# Patient Record
(demographics unavailable — no encounter records)

---

## 2024-10-14 NOTE — HISTORY OF PRESENT ILLNESS
[FreeTextEntry1] : Location: knees, right worse medially Severity: moderate.  It was better after PT.  Duration: few months Context: plays basketball and baseball Aggravating Factors: walking Alleviating Factors: PT, rest Associated Symptoms: denies weight loss, fever, chills, change in bowel/bladder habits, weakness, numbness/tingling,  Prior Studies:  Back and hips hurting still.  Pain with sitting.  He was in car for 4 hr to [facility]. No pain down legs.  Hx of walking pigeon toed.

## 2024-10-14 NOTE — ASSESSMENT
[FreeTextEntry1] : Discussed diagnosis and treatment plan including PT. no running or sports for now.  When can walk and do stairs without pain, can jog slowly.  Then can go faster in a few days if no pain.  Slowly progress.  ice area often. Try voltaren gel Reminded he needs to stretch daily multiple times.   take ibuprofen prn but never before exercise Limit sitting. Improve posture corrected hip abd learn side planks, side shuffle, running form work on walking normally with heel to toe  basketball starts in Nov see Rheumatologist given laxity and stretchy skin  f/u 3 wk mom and dad present during visit  I spent 30 min  obtaining appropriate history and listening to the patient performing a medically necessary, pertinent and appropriate examination discussing diagnosis, treatment options, and plan documenting visit

## 2024-10-14 NOTE — DATA REVIEWED
[FreeTextEntry1] : xrays of lumbar ap and lateral show loss of lordosis. NO other gross abnormalities.

## 2024-10-14 NOTE — PHYSICAL EXAM
[FreeTextEntry1] : 17 year old male in NAD and normal body habitus   Gait - right foot pronates a bit   KNEE  no swelling, erythema, warmth genu recurvatum flexion to 120 deg no TTP in patellar tendon, TTP in right medial knee 5/5 knee ext pop angle 70 deg quads a bit tight  LUMBAR ROM: flexion to 30 deg, ext to 5 deg  Gait: normal  Inspection: no erythema, warmth; poor sittling posture Spine: no TTP in spinous process Bony palpation: no TTP in GT   Soft tissue palpation of spine: no TTP in lumbar paraspinals  5/5 bilateral KE, DF, PF, 4 left hip abd, 5- right hip abd sensation intact in bilat LE  Special tests: neg seated SLR can bring thumb close to forearm

## 2024-11-08 NOTE — CONSULT LETTER
[Dear  ___] : Dear  [unfilled], [Courtesy Letter:] : I had the pleasure of seeing your patient, [unfilled], in my office today. [Please see my note below.] : Please see my note below. [Consult Closing:] : Thank you very much for allowing me to participate in the care of this patient.  If you have any questions, please do not hesitate to contact me. [Sincerely,] : Sincerely, [FreeTextEntry2] : Ashanti Aviles MD [address] [place], [place] [id number] [FreeTextEntry3] : Denise Cerrato MD Attending Physician, Pediatric Rheumatology [facility] | [facility]

## 2024-11-08 NOTE — PHYSICAL EXAM
[PERRLA] : LUCILA [S1, S2 Present] : S1, S2 present [Clear to auscultation] : clear to auscultation [Soft] : soft [NonTender] : non tender [Non Distended] : non distended [Normal Bowel Sounds] : normal bowel sounds [No Abnormal Lymph Nodes Palpated] : no abnormal lymph nodes palpated [No Hepatosplenomegaly] : no hepatosplenomegaly [Range Of Motion] : full range of motion [Intact Judgement] : intact judgement  [Insight Insight] : intact insight [Cranial nerves grossly intact] : cranial nerves grossly intact [Not Examined] : not examined [_______] : HIP: [unfilled] [Acute distress] : no acute distress [Erythematous Conjunctiva] : nonerythematous conjunctiva [Erythematous Oropharynx] : nonerythematous oropharynx [Lesions] : no lesions [Murmurs] : no murmurs [Joint effusions] : no joint effusions [FreeTextEntry1] : well-appearing [de-identified] : no signs of arthritis, +patellar grind bilaterally, tenderness along lateral aspect of left knee  [de-identified] : FROM, no tenderness [de-identified] : no tenderness [de-identified] : no tenderness [de-identified] : no tenderness [de-identified] : full forward flexion [de-identified] : no gross discrepancy [de-identified] : none noted

## 2024-11-08 NOTE — CONSULT LETTER
[Dear  ___] : Dear  [unfilled], [Courtesy Letter:] : I had the pleasure of seeing your patient, [unfilled], in my office today. [Please see my note below.] : Please see my note below. [Consult Closing:] : Thank you very much for allowing me to participate in the care of this patient.  If you have any questions, please do not hesitate to contact me. [Sincerely,] : Sincerely, [FreeTextEntry2] : Ashanti Aviles MD [address] [place], [place] [id number] [FreeTextEntry3] : Denise Cerrato MD Attending Physician, Pediatric Rheumatology [facility] | [facility]

## 2024-11-08 NOTE — PHYSICAL EXAM
[PERRLA] : LUCILA [S1, S2 Present] : S1, S2 present [Clear to auscultation] : clear to auscultation [Soft] : soft [NonTender] : non tender [Non Distended] : non distended [Normal Bowel Sounds] : normal bowel sounds [No Hepatosplenomegaly] : no hepatosplenomegaly [No Abnormal Lymph Nodes Palpated] : no abnormal lymph nodes palpated [Range Of Motion] : full range of motion [Intact Judgement] : intact judgement  [Insight Insight] : intact insight [Cranial nerves grossly intact] : cranial nerves grossly intact [Not Examined] : not examined [_______] : HIP: [unfilled] [Acute distress] : no acute distress [Erythematous Conjunctiva] : nonerythematous conjunctiva [Erythematous Oropharynx] : nonerythematous oropharynx [Lesions] : no lesions [Murmurs] : no murmurs [Joint effusions] : no joint effusions [FreeTextEntry1] : well-appearing [de-identified] : no signs of arthritis, +patellar grind bilaterally, tenderness along lateral aspect of left knee  [de-identified] : FROM, no tenderness [de-identified] : no tenderness [de-identified] : no tenderness [de-identified] : no tenderness [de-identified] : full forward flexion [de-identified] : no gross discrepancy [de-identified] : none noted

## 2024-11-08 NOTE — END OF VISIT
Continue Regimen: Triamcinolone from home for flares.
Detail Level: Zone
[Time Spent: ___ minutes] : I have spent [unfilled] minutes of time on the encounter which excludes teaching and separately reported services.

## 2024-11-08 NOTE — IMMUNIZATIONS
[Immunizations are up to date] : Immunizations are up to date [Records maintained by [facility]] : Records maintained by [facility]

## 2024-11-08 NOTE — HISTORY OF PRESENT ILLNESS
[Noncontributory] : The patient's family history was noncontributory [Unlimited ADLs] : able to do activities of daily living without limitations [Unlimited Sports] : able to participate in sports without limitations [FreeTextEntry1] : Jose is a 17-year-old male who presents for evaluation of joint pain.   Father reports that Jose developed hip pain when he was about 10 years old - he had orthopedic evaluation done that that time that was negative. Jose reports that he has continued to have intermittent bilateral hip pain (R >> L). Pain comes and goes and is sometimes related to activity but sometimes occur without activity. He also has a history of knee pain that was attributed to Osgood Schlatter in the past and more recently with patellofemoral syndrome of both knees and has done PT multiple times with improvement in pain.   Father reports that Jose is very active and plays basketball and baseball for his school team. He was away in [facility] this summer and was playing soccer a lot and he started to experience pain in his hips and knees. When he came home from [facility], father reports that he started to walking 'funny.' No joint swelling, limitation, or AM stiffness. He was seen by physiatry - x-rays overall reassuring and was advised to start PT. Jose is doing PT twice a week with improvement in pain and he is stretching before and after sports practice. He still notes pain and reports feeling 'stiff' and needs to take Advil PRN before practice. Pain does not stop him from practices.   Occasionally has some pain in shoulders and calves, but this is mainly activity related. He had some shoulder pain when carrying a bag of items that dad felt was not very heavy.   No fever, headache, visual changes, mouth sores, cough, congestion, chest pain, difficulty breathing, nausea, vomiting, diarrhea, constipation, blood in the stool, abdominal pain, dysuria, hematuria, joint swelling, morning stiffness, back pain, or rash.   Past Medical History: None Past Surgical History: None Family History: Father undergoing w/u for RA  Social History: 11th grade. Lives with parents. Brother away at college.  Medications: Advil PRN Allergies: NKDA, cats, pollen

## 2024-11-08 NOTE — CONSULT LETTER
[Dear  ___] : Dear  [unfilled], [Courtesy Letter:] : I had the pleasure of seeing your patient, [unfilled], in my office today. [Please see my note below.] : Please see my note below. [Consult Closing:] : Thank you very much for allowing me to participate in the care of this patient.  If you have any questions, please do not hesitate to contact me. [Sincerely,] : Sincerely, [FreeTextEntry2] : Ashanti Aviles MD [address] [place], [place] [phone] [FreeTextEntry3] : Denise Cerrato MD Attending Physician, Pediatric Rheumatology [facility] | [facility]

## 2024-11-08 NOTE — PHYSICAL EXAM
[PERRLA] : LUCILA [S1, S2 Present] : S1, S2 present [Clear to auscultation] : clear to auscultation [Soft] : soft [NonTender] : non tender [Non Distended] : non distended [Normal Bowel Sounds] : normal bowel sounds [No Hepatosplenomegaly] : no hepatosplenomegaly [No Abnormal Lymph Nodes Palpated] : no abnormal lymph nodes palpated [Range Of Motion] : full range of motion [Intact Judgement] : intact judgement  [Insight Insight] : intact insight [Cranial nerves grossly intact] : cranial nerves grossly intact [Not Examined] : not examined [_______] : HIP: [unfilled] [Acute distress] : no acute distress [Erythematous Conjunctiva] : nonerythematous conjunctiva [Erythematous Oropharynx] : nonerythematous oropharynx [Lesions] : no lesions [Murmurs] : no murmurs [Joint effusions] : no joint effusions [FreeTextEntry1] : well-appearing [de-identified] : no signs of arthritis, +patellar grind bilaterally, tenderness along lateral aspect of left knee  [de-identified] : FROM, no tenderness [de-identified] : no tenderness [de-identified] : no tenderness [de-identified] : no tenderness [de-identified] : full forward flexion [de-identified] : no gross discrepancy [de-identified] : none noted

## 2024-11-08 NOTE — HISTORY OF PRESENT ILLNESS
[Noncontributory] : The patient's family history was noncontributory [Unlimited ADLs] : able to do activities of daily living without limitations [Unlimited Sports] : able to participate in sports without limitations [FreeTextEntry1] : Jose is a 17-year-old male who presents for evaluation of joint pain.   Father reports that Jose developed hip pain when he was about 10 years old - he had orthopedic evaluation done that that time that was negative. Jose reports that he has continued to have intermittent bilateral hip pain (R >> L). Pain comes and goes and is sometimes related to activity but sometimes occur without activity. He also has a history of knee pain that was attributed to Osgood Schlatter in the past and more recently with patellofemoral syndrome of both knees and has done PT multiple times with improvement in pain.   Father reports that Jose is very active and plays basketball and baseball for his school team. He was away in [facility] this summer and was playing soccer a lot and he started to experience pain in his hips and knees. When he came home from [facility], father reports that he started to walking 'funny.' No joint swelling, limitation, or AM stiffness. He was seen by physiatry - x-rays overall reassuring and was advised to start PT. Jose is doing PT twice a week with improvement in pain and he is stretching before and after sports practice. He still notes pain and reports feeling 'stiff' and needs to take Advil PRN before practice. Pain does not stop him from practices.   Occasionally has some pain in shoulders and calves, but this is mainly activity related. He had some shoulder pain when carrying a bag of items that dad felt was not very heavy.   No fever, headache, visual changes, mouth sores, cough, congestion, chest pain, difficulty breathing, nausea, vomiting, diarrhea, constipation, blood in the stool, abdominal pain, dysuria, hematuria, joint swelling, morning stiffness, back pain, or rash.   Past Medical History: None Past Surgical History: None Family History: Father undergoing w/u for RA  Social History: 11th grade. Lives with parents. Brother away at college.  Medications: Advil PRN Allergies: NKDA, cats, pollen

## 2025-01-15 NOTE — PHYSICAL EXAM
[PERRLA] : LUCILA [S1, S2 Present] : S1, S2 present [Clear to auscultation] : clear to auscultation [Soft] : soft [NonTender] : non tender [Non Distended] : non distended [Normal Bowel Sounds] : normal bowel sounds [No Hepatosplenomegaly] : no hepatosplenomegaly [No Abnormal Lymph Nodes Palpated] : no abnormal lymph nodes palpated [Range Of Motion] : full range of motion [Cranial nerves grossly intact] : cranial nerves grossly intact [Intact Judgement] : intact judgement  [Insight Insight] : intact insight [Not Examined] : not examined [_______] : HIP: [unfilled] [Acute distress] : no acute distress [Erythematous Conjunctiva] : nonerythematous conjunctiva [Erythematous Oropharynx] : nonerythematous oropharynx [Lesions] : no lesions [Murmurs] : no murmurs [Refer to Joint Diagram Below] : refer to joint diagram below [Joint effusions] : no joint effusions [FreeTextEntry1] : well-appearing [de-identified] : no signs of arthritis, (-) patellar grind [de-identified] : FROM, no tenderness [de-identified] : no tenderness [de-identified] : no tenderness [de-identified] : no tenderness [de-identified] : no tender points  [de-identified] : modified 15 cm --> 18-19 cm, pain in left hip with forward flexion [de-identified] : no gross discrepancy [de-identified] : none noted

## 2025-01-15 NOTE — HISTORY OF PRESENT ILLNESS
[Noncontributory] : The patient's family history was noncontributory [Unlimited ADLs] : able to do activities of daily living without limitations [Unlimited Sports] : able to participate in sports without limitations [FreeTextEntry1] : Jose presents today for follow-up.   Since last visit, he reports that he had some initial improvement in his hip pain, but toward the end of November 2024, he started to experiencing bilateral hip pain (L >> R), low back pain, and pain in left buttock. He was pushing through his basketball season and taking Advil in order to play, but would feel very tired and in pain despite Advil. Only taking Advil on days of basketball games. He endorses AM stiffness - some limitation in bending - has to sit on a chair in order to put on pants/socks. Has about 10-15 minutes of stiffness that improves throughout day but if in a seated position for too long, will have stiffness upon rising. No pain in knees or joint swelling. He was in [place] over the holiday break, and was having pain with tennis and swimming. Mother notes that during that time he was also limping. Over the last 1-2 weeks he has had gradual improvement in pain, but still with some limitations.   He does endorse some stress, which he feels may be contributing to pain. He had an episode of chest pain after his basketball game yesterday - it was very brief and wasn't sure if it was stress related but he was in some pain at that time. He also notes two episodes of difficulty focusing that led to him having some stress/anxiety during an exam. He is working on breathing exercises to help with stress.   No fever, headache, visual changes, mouth sores, cough, congestion, chest pain, difficulty breathing, nausea, vomiting, diarrhea, constipation, blood in the stool, abdominal pain, dysuria, hematuria, joint pain, joint swelling, morning stiffness, back pain, or rash.

## 2025-01-15 NOTE — CONSULT LETTER
[Dear  ___] : Dear  [unfilled], [Courtesy Letter:] : I had the pleasure of seeing your patient, [unfilled], in my office today. [Please see my note below.] : Please see my note below. [Consult Closing:] : Thank you very much for allowing me to participate in the care of this patient.  If you have any questions, please do not hesitate to contact me. [Sincerely,] : Sincerely, [FreeTextEntry2] : Ashanti Aviles MD [address] [place], [place] [id number] [FreeTextEntry3] : Denise Cerrato MD Attending Physician, Pediatric Rheumatology [facility] | [facility]

## 2025-01-15 NOTE — REASON FOR VISIT
[Follow-Up: _____] : [unfilled] is  being seen for a [unfilled] follow-up visit [Patient] : patient [Medical Records] : medical records [Mother] : mother

## 2025-04-15 NOTE — PHYSICAL EXAM
[Acute distress] : no acute distress [PERRLA] : LUCILA [Erythematous Conjunctiva] : nonerythematous conjunctiva [Erythematous Oropharynx] : nonerythematous oropharynx [S1, S2 Present] : S1, S2 present [Murmurs] : no murmurs [Clear to auscultation] : clear to auscultation [Soft] : soft [NonTender] : non tender [Non Distended] : non distended [Normal Bowel Sounds] : normal bowel sounds [No Hepatosplenomegaly] : no hepatosplenomegaly [No Abnormal Lymph Nodes Palpated] : no abnormal lymph nodes palpated [Refer to Joint Diagram Below] : refer to joint diagram below [Range Of Motion] : full range of motion [Joint effusions] : no joint effusions [Cranial nerves grossly intact] : cranial nerves grossly intact [Intact Judgement] : intact judgement  [Insight Insight] : intact insight [_______] : HIP: [unfilled] [de-identified] : FROM, no tenderness [de-identified] : no tenderness [de-identified] : no tenderness [de-identified] : no tenderness [de-identified] : no tender points  [de-identified] : modified 15 cm --> 18-19 cm, pain in left hip with forward flexion [de-identified] : no gross discrepancy [de-identified] : none noted [Lesions] : no lesions [Not Examined] : not examined [FreeTextEntry1] : limited by audio only visit  [de-identified] : limited by audio only visit  [FreeTextEntry3] : limited by audio only visit  [FreeTextEntry2] : limited by audio only visit  [FreeTextEntry5] : limited by audio only visit  [FreeTextEntry4] : limited by audio only visit  [FreeTextEntry9] : limited by audio only visit  [de-identified] : limited by audio only visit  [de-identified] : limited by audio only visit  [de-identified] : limited by audio only visit

## 2025-04-15 NOTE — HISTORY OF PRESENT ILLNESS
[Noncontributory] : The patient's family history was noncontributory [Unlimited ADLs] : able to do activities of daily living without limitations [Unlimited Sports] : able to participate in sports without limitations [FreeTextEntry1] : Jose presents today for follow-up.   He reports feeling well overall since starting Humira every 2 weeks. No longer having any back pain, limitation or AM stiffness. Occassionally will have some pain in lower back when sitting for a long period of time, but it resolves quickly. Only using naproxen PRN. He is playing sports (baseball) without limitation. Parents also note improvement in his pain and mobility.   Current medications:  - Humira 40 mg SQ every 2 weeks - no reported side effects, father giving injections - Naproxen PRN   No fever, headache, visual changes, mouth sores, cough, congestion, chest pain, difficulty breathing, nausea, vomiting, diarrhea, constipation, blood in the stool, abdominal pain, dysuria, hematuria, joint pain, joint swelling, morning stiffness, back pain, or rash.

## 2025-04-15 NOTE — PHYSICAL EXAM
[Acute distress] : no acute distress [PERRLA] : LUCILA [Erythematous Conjunctiva] : nonerythematous conjunctiva [Erythematous Oropharynx] : nonerythematous oropharynx [S1, S2 Present] : S1, S2 present [Murmurs] : no murmurs [Clear to auscultation] : clear to auscultation [Soft] : soft [NonTender] : non tender [Non Distended] : non distended [Normal Bowel Sounds] : normal bowel sounds [No Hepatosplenomegaly] : no hepatosplenomegaly [No Abnormal Lymph Nodes Palpated] : no abnormal lymph nodes palpated [Refer to Joint Diagram Below] : refer to joint diagram below [Range Of Motion] : full range of motion [Joint effusions] : no joint effusions [Cranial nerves grossly intact] : cranial nerves grossly intact [Intact Judgement] : intact judgement  [Insight Insight] : intact insight [_______] : HIP: [unfilled] [de-identified] : FROM, no tenderness [de-identified] : no tenderness [de-identified] : no tenderness [de-identified] : no tenderness [de-identified] : no tender points  [de-identified] : modified 15 cm --> 18-19 cm, pain in left hip with forward flexion [de-identified] : no gross discrepancy [de-identified] : none noted [Lesions] : no lesions [Not Examined] : not examined [FreeTextEntry1] : limited by audio only visit  [de-identified] : limited by audio only visit  [FreeTextEntry3] : limited by audio only visit  [FreeTextEntry2] : limited by audio only visit  [FreeTextEntry5] : limited by audio only visit  [FreeTextEntry4] : limited by audio only visit  [FreeTextEntry9] : limited by audio only visit  [de-identified] : limited by audio only visit  [de-identified] : limited by audio only visit  [de-identified] : limited by audio only visit

## 2025-04-15 NOTE — CONSULT LETTER
[Dear  ___] : Dear  [unfilled], [Courtesy Letter:] : I had the pleasure of seeing your patient, [unfilled], in my office today. [Please see my note below.] : Please see my note below. [Consult Closing:] : Thank you very much for allowing me to participate in the care of this patient.  If you have any questions, please do not hesitate to contact me. [Sincerely,] : Sincerely, [FreeTextEntry2] : Ashanti Aviles MD [address] [place], [place] [phone] [FreeTextEntry3] : Denise Cerrato MD Attending Physician, Pediatric Rheumatology [facility] | [facility]

## 2025-04-15 NOTE — CONSULT LETTER
[Dear  ___] : Dear  [unfilled], [Courtesy Letter:] : I had the pleasure of seeing your patient, [unfilled], in my office today. [Please see my note below.] : Please see my note below. [Consult Closing:] : Thank you very much for allowing me to participate in the care of this patient.  If you have any questions, please do not hesitate to contact me. [Sincerely,] : Sincerely, [FreeTextEntry2] : Ashanti Aviles MD [address] [place], [place] [id number] [FreeTextEntry3] : Denise Cerrato MD Attending Physician, Pediatric Rheumatology [facility] | [facility]

## 2025-04-15 NOTE — REASON FOR VISIT
[Follow-Up: _____] : [unfilled] is  being seen for a [unfilled] follow-up visit [Patient] : patient [Mother] : mother [Medical Records] : medical records [Home] : at home, [unfilled] , at the time of the visit. [Medical Office: ([facility])___] : at the medical office located in  [This encounter was initiated by telehealth (audio with video) and converted to telephone (audio only)] : This encounter was initiated by telehealth (audio with video) and converted to telephone (audio only) [Technical] : patient unable to effectively utilize tele-video due to technical issues. [Parents] : parents [FreeTextEntry3] : Abiodun Lezama, Father